# Patient Record
Sex: FEMALE | Race: BLACK OR AFRICAN AMERICAN | NOT HISPANIC OR LATINO | URBAN - METROPOLITAN AREA
[De-identification: names, ages, dates, MRNs, and addresses within clinical notes are randomized per-mention and may not be internally consistent; named-entity substitution may affect disease eponyms.]

---

## 2024-02-22 DIAGNOSIS — R76.8 HEPATITIS B ANTIBODY POSITIVE: ICD-10-CM

## 2024-02-22 DIAGNOSIS — B15.9 HEPATITIS A TEST POSITIVE: Primary | ICD-10-CM

## 2024-05-08 ENCOUNTER — OFFICE VISIT (OUTPATIENT)
Dept: INFECTIOUS DISEASES | Facility: CLINIC | Age: 74
End: 2024-05-08
Payer: MEDICARE

## 2024-05-08 VITALS
HEART RATE: 71 BPM | BODY MASS INDEX: 43.08 KG/M2 | TEMPERATURE: 98 F | WEIGHT: 252.31 LBS | DIASTOLIC BLOOD PRESSURE: 72 MMHG | RESPIRATION RATE: 18 BRPM | SYSTOLIC BLOOD PRESSURE: 118 MMHG | HEIGHT: 64 IN

## 2024-05-08 DIAGNOSIS — R76.8 HEPATITIS A ANTIBODY POSITIVE: ICD-10-CM

## 2024-05-08 DIAGNOSIS — R76.8 HEPATITIS B CORE ANTIBODY POSITIVE: Primary | ICD-10-CM

## 2024-05-08 DIAGNOSIS — R76.8 HEPATITIS B ANTIBODY POSITIVE: ICD-10-CM

## 2024-05-08 PROCEDURE — 99215 OFFICE O/P EST HI 40 MIN: CPT | Mod: PBBFAC | Performed by: NURSE PRACTITIONER

## 2024-05-08 PROCEDURE — 99203 OFFICE O/P NEW LOW 30 MIN: CPT | Mod: S$PBB,,, | Performed by: NURSE PRACTITIONER

## 2024-05-08 RX ORDER — POTASSIUM CHLORIDE 750 MG/1
10 TABLET, EXTENDED RELEASE ORAL DAILY PRN
COMMUNITY
Start: 2024-04-22

## 2024-05-08 RX ORDER — TRAMADOL HYDROCHLORIDE 50 MG/1
50 TABLET ORAL DAILY PRN
COMMUNITY
Start: 2024-04-24

## 2024-05-08 RX ORDER — LATANOPROST 50 UG/ML
1 SOLUTION/ DROPS OPHTHALMIC NIGHTLY
COMMUNITY
Start: 2024-05-01

## 2024-05-08 RX ORDER — ROSUVASTATIN CALCIUM 20 MG/1
20 TABLET, COATED ORAL
COMMUNITY

## 2024-05-08 RX ORDER — ALBUTEROL SULFATE 90 UG/1
2 AEROSOL, METERED RESPIRATORY (INHALATION) EVERY 4 HOURS PRN
COMMUNITY
Start: 2024-04-22

## 2024-05-08 RX ORDER — LISINOPRIL 5 MG/1
5 TABLET ORAL
COMMUNITY

## 2024-05-08 RX ORDER — TIZANIDINE 4 MG/1
4 TABLET ORAL 2 TIMES DAILY PRN
COMMUNITY
Start: 2024-04-22

## 2024-05-08 RX ORDER — HYDROCODONE BITARTRATE AND ACETAMINOPHEN 7.5; 325 MG/1; MG/1
1 TABLET ORAL
COMMUNITY
Start: 2024-04-24

## 2024-05-08 RX ORDER — GABAPENTIN 600 MG/1
600 TABLET ORAL 4 TIMES DAILY
COMMUNITY
Start: 2024-05-01

## 2024-05-08 RX ORDER — VIBEGRON 75 MG/1
1 TABLET, FILM COATED ORAL
COMMUNITY

## 2024-05-08 RX ORDER — FUROSEMIDE 20 MG/1
20 TABLET ORAL DAILY PRN
COMMUNITY
Start: 2024-04-22

## 2024-05-08 RX ORDER — LEVOTHYROXINE SODIUM 25 UG/1
25 TABLET ORAL EVERY MORNING
COMMUNITY

## 2024-05-08 RX ORDER — NAPROXEN SODIUM 220 MG/1
81 TABLET, FILM COATED ORAL
COMMUNITY

## 2024-05-08 RX ORDER — LIDOCAINE 50 MG/G
OINTMENT TOPICAL
COMMUNITY
Start: 2024-01-04

## 2024-05-08 RX ORDER — ISOSORBIDE MONONITRATE 120 MG/1
120 TABLET, EXTENDED RELEASE ORAL
COMMUNITY
Start: 2024-05-01

## 2024-05-08 RX ORDER — FLUTICASONE FUROATE, UMECLIDINIUM BROMIDE AND VILANTEROL TRIFENATATE 100; 62.5; 25 UG/1; UG/1; UG/1
1 POWDER RESPIRATORY (INHALATION)
COMMUNITY
Start: 2024-04-22

## 2024-05-08 RX ORDER — AMLODIPINE BESYLATE 10 MG/1
10 TABLET ORAL
COMMUNITY

## 2024-05-08 RX ORDER — DIPHENHYDRAMINE HYDROCHLORIDE 25 MG/1
25 CAPSULE ORAL NIGHTLY
COMMUNITY
Start: 2024-04-23

## 2024-05-08 RX ORDER — CITALOPRAM 20 MG/1
20 TABLET, FILM COATED ORAL
COMMUNITY

## 2024-05-08 RX ORDER — CLINDAMYCIN HYDROCHLORIDE 150 MG/1
300 CAPSULE ORAL 2 TIMES DAILY
COMMUNITY
Start: 2024-04-23

## 2024-05-08 NOTE — PROGRESS NOTES
Patient ID: Osiris Johns 73 y.o.     Chief Complaint:   Chief Complaint   Patient presents with    New referral     Hep B        HPI:    Mrs. Osiris Johns is a 72 y/o AAF here for initial visit for a positive Hep B core ab. She was referred by Dr. Evelina Mejia. Pt is accompanied by her daughter Darwin. She denies any hx of IVDU, tattoos, or blood transfusion.  Co-morbidities include multiple cerebral aneurysms (s/p 3 clips), hypothyroidism, HTN, HLD, s/p 3xMI, and DJD. Reviewed referral labs from 12/11/23 Hep A ab reactive, Hep B s ab reactive, Hep B core ab total reactive, Hep B s ag neg, Hep C neg, ALBERTA neg. She denies fever, headache, chills, visual problems, icterus, jaundice, N/V/D, esophageal varices, SOB,cough, abd pain, ascites or kenn colored stools. Reviewed med list. No biologics noted.  Pt is due for a COVID booster.           Past Medical History:   Diagnosis Date    Arthritis     COPD (chronic obstructive pulmonary disease)     Depression     Hyperlipidemia     Hypertension     Lupus         Past Surgical History:   Procedure Laterality Date    CARDIAC SURGERY  2010    TOTAL KNEE ARTHROPLASTY Right         Social History     Socioeconomic History    Marital status: Single   Tobacco Use    Smoking status: Former     Types: Cigarettes    Smokeless tobacco: Never   Substance and Sexual Activity    Alcohol use: Not Currently    Drug use: Never    Sexual activity: Not Currently     Partners: Male        Family History   Problem Relation Name Age of Onset    Hypertension Mother      Heart attack Mother      Heart attack Father      Hypertension Father      Cancer Sister      Aneurysm Sister      Heart attack Brother      Cancer Brother          Review of patient's allergies indicates:   Allergen Reactions    Penicillins Swelling     Lip swelling and face, and hives.          There is no immunization history on file for this patient.     Review of Systems   Constitutional: Negative.    HENT: Negative.    "  Eyes: Negative.    Respiratory: Negative.     Cardiovascular: Negative.    Gastrointestinal: Negative.    Genitourinary: Negative.    Musculoskeletal: Negative.    Skin: Negative.    Neurological: Negative.    Endo/Heme/Allergies: Negative.    Psychiatric/Behavioral: Negative.     All other systems reviewed and are negative.         Objective:      /72   Pulse 71   Temp 98.3 °F (36.8 °C)   Resp 18   Ht 5' 4" (1.626 m)   Wt 114.4 kg (252 lb 5.1 oz)   LMP  (LMP Unknown)   BMI 43.31 kg/m²      Physical Exam  Vitals reviewed.   Constitutional:       General: She is not in acute distress.     Appearance: Normal appearance. She is obese.   HENT:      Head: Normocephalic.      Right Ear: External ear normal.      Left Ear: External ear normal.      Nose: Nose normal.      Mouth/Throat:      Mouth: Mucous membranes are moist.      Pharynx: Oropharynx is clear.   Eyes:      General: No scleral icterus.     Extraocular Movements: Extraocular movements intact.      Conjunctiva/sclera: Conjunctivae normal.      Pupils: Pupils are equal, round, and reactive to light.   Cardiovascular:      Rate and Rhythm: Normal rate and regular rhythm.      Pulses: Normal pulses.      Heart sounds: Normal heart sounds.   Pulmonary:      Effort: Pulmonary effort is normal. No respiratory distress.      Breath sounds: Normal breath sounds.   Abdominal:      General: Bowel sounds are normal. There is no distension.      Palpations: Abdomen is soft. There is no mass.      Tenderness: There is no abdominal tenderness. There is no right CVA tenderness or left CVA tenderness.      Hernia: No hernia is present.   Musculoskeletal:         General: No tenderness or signs of injury. Normal range of motion.      Cervical back: Normal range of motion and neck supple.      Right lower leg: No edema.      Left lower leg: No edema.   Lymphadenopathy:      Cervical: No cervical adenopathy.   Skin:     General: Skin is warm and dry.      " Capillary Refill: Capillary refill takes less than 2 seconds.      Findings: No erythema or lesion.   Neurological:      General: No focal deficit present.      Mental Status: She is alert and oriented to person, place, and time. Mental status is at baseline.   Psychiatric:         Mood and Affect: Mood normal.         Behavior: Behavior normal.         Thought Content: Thought content normal.         Judgment: Judgment normal.          Labs:   Lab Results   Component Value Date     09/19/2022       CMP  Sodium   Date Value Ref Range Status   09/20/2022 139 136 - 145 mmol/L Final     Potassium   Date Value Ref Range Status   09/20/2022 3.6 3.5 - 5.1 mmol/L Final     Chloride   Date Value Ref Range Status   09/20/2022 105 100 - 109 mmol/L Final     Carbon Dioxide   Date Value Ref Range Status   09/20/2022 22 22 - 33 mmol/L Final     Blood Urea Nitrogen   Date Value Ref Range Status   09/20/2022 8 5 - 25 mg/dL Final     Creatinine   Date Value Ref Range Status   09/20/2022 0.73 0.57 - 1.25 mg/dL Final     Calcium   Date Value Ref Range Status   09/20/2022 8.2 (L) 8.8 - 10.6 mg/dL Final     Anion Gap   Date Value Ref Range Status   09/20/2022 12 8 - 16 mmol/L Final     Imaging: Reviewed most recent relevant imaging studies available, notable results highlighted in this note      Medications:     Current Outpatient Medications   Medication Instructions    albuterol (PROVENTIL/VENTOLIN HFA) 90 mcg/actuation inhaler 2 puffs, Inhalation, Every 4 hours PRN    amLODIPine (NORVASC) 10 mg, Oral    aspirin 81 mg, Oral    BANOPHEN 25 mg, Oral, Nightly    citalopram (CELEXA) 20 mg, Oral    clindamycin (CLEOCIN) 300 mg, Oral, 2 times daily    furosemide (LASIX) 20 mg, Oral, Daily PRN    gabapentin (NEURONTIN) 600 mg, Oral, 4 times daily    GEMTESA 75 mg Tab 1 tablet, Oral    HYDROcodone-acetaminophen (NORCO) 7.5-325 mg per tablet 1 tablet, Oral    isosorbide mononitrate (IMDUR) 120 mg, Oral    latanoprost 0.005 % ophthalmic  solution 1 drop, Both Eyes, Nightly    levothyroxine (SYNTHROID) 25 mcg, Oral, Every morning    LIDOcaine (XYLOCAINE) 5 % Oint ointment Topical (Top)    lisinopriL (PRINIVIL,ZESTRIL) 5 mg, Oral    potassium chloride (KLOR-CON) 10 MEQ TbSR 10 mEq, Oral, Daily PRN    rosuvastatin (CRESTOR) 20 mg, Oral    tiZANidine (ZANAFLEX) 4 mg, Oral, 2 times daily PRN    traMADoL (ULTRAM) 50 mg, Oral, Daily PRN    TRELEGY ELLIPTA 100-62.5-25 mcg DsDv 1 puff, Oral       Assessment:       Problem List Items Addressed This Visit    None  Visit Diagnoses       Hepatitis B core antibody positive    -  Primary    Hepatitis B antibody positive        Hepatitis A antibody positive                   Plan:      Hepatitis B core antibody positive  Discussed labs extensively with patient.  Explained that she is immune to both Hep A and Hep B. However if she were to need a biologic or chemotherapy, patient would need to be on Hep B preventative treatment to prevent a reactivation.  She verbalized understanding   RTC prn   COVID vaccine recommended    Hepatitis B antibody positive  -     Ambulatory referral/consult to Infectious Disease  Immune by natural immunity     Hepatitis A antibody positive  Immune       31 minutes of total time spent on the encounter, which includes face to face time and non-face to face time preparing to see the patient (eg, review of tests), Obtaining and/or reviewing separately obtained history, Documenting clinical information in the electronic or other health record, Independently interpreting results (not separately reported) and communicating results to the patient/family/caregiver, or Care coordination (not separately reported).